# Patient Record
(demographics unavailable — no encounter records)

---

## 2024-10-15 NOTE — PHYSICAL EXAM
[Normal] : affect was normal and insight and judgment were intact [Normal Sclera/Conjunctiva] : normal sclera/conjunctiva [Normal Outer Ear/Nose] : the outer ears and nose were normal in appearance [No JVD] : no jugular venous distention [No Respiratory Distress] : no respiratory distress  [No Accessory Muscle Use] : no accessory muscle use [Clear to Auscultation] : lungs were clear to auscultation bilaterally [Normal Rate] : normal rate  [Regular Rhythm] : with a regular rhythm [No Edema] : there was no peripheral edema [Soft] : abdomen soft [Non Tender] : non-tender [Non-distended] : non-distended [Coordination Grossly Intact] : coordination grossly intact [No Focal Deficits] : no focal deficits

## 2024-10-17 NOTE — HISTORY OF PRESENT ILLNESS
[Atrial Fibrillation] : atrial fibrillation [Coronary Artery Disease] : coronary artery disease [Recent Myocardial Infarction] : recent myocardial infarction [Chronic Anticoagulation] : chronic anticoagulation [Diabetes] : diabetes [Moderate (4-6 METs)] : Moderate (4-6 METs) [Aortic Stenosis] : no aortic stenosis [Implantable Device/Pacemaker] : no implantable device/pacemaker [Asthma] : no asthma [COPD] : no COPD [Sleep Apnea] : no sleep apnea [Smoker] : not a smoker [Family Member] : no family member with adverse anesthesia reaction/sudden death [Self] : no previous adverse anesthesia reaction [Chronic Kidney Disease] : no chronic kidney disease [Anti-Platelet Agents: _____] : Anti-Platelet Agents: [unfilled] [Anticoagulants: _____] : Anticoagulants: [unfilled] [FreeTextEntry1] : carpel tunnel  [FreeTextEntry2] : 10/18/24 [FreeTextEntry3] : Dr. Gruber [FreeTextEntry4] : 58 y/o female with h/o hypothyroidism, HTN DM type 2, hemolytic anemia, CAD, cardiac stent, PAF, left carpal tunnel syndrome is coming in for [facility] for Left carpal tunnel release on 10/18/2024.  No acute complaints today.  [FreeTextEntry5] : Sep 2023 MI [FreeTextEntry6] : slight twinge in chest, but no CP, has chronic MEDINA due to hemolytic anemia. Denies orthopnea, palpitations, syncope.

## 2024-10-17 NOTE — ASSESSMENT
[As per surgery] : as per surgery [Patient Optimized for Surgery] : Patient optimized for surgery [No Further Testing Recommended] : no further testing recommended [FreeTextEntry4] : 58 y/o female with h/o hypothyroidism, HTN DM type 2, hemolytic anemia, CAD, cardiac stent, PAF, left carpal tunnel syndrome is coming in for [facility] for Left carpal tunnel release on 10/18/2024.  #Pre-op exam -Recent CBC and CMP reviewed, stable -EKG done, sinus, similar to prior EKGs  -Cleared by Cardiology today  Patient is acceptable risk for this carpel tunnel surgery, medically optimized. [FreeTextEntry5] : As per Cards [FreeTextEntry6] : As per Cards

## 2024-10-17 NOTE — ASSESSMENT
[As per surgery] : as per surgery [Patient Optimized for Surgery] : Patient optimized for surgery [No Further Testing Recommended] : no further testing recommended [FreeTextEntry4] : 58 y/o female with h/o hypothyroidism, HTN DM type 2, hemolytic anemia, CAD, cardiac stent, PAF, left carpal tunnel syndrome is coming in for [facility] for Left carpal tunnel release on 10/18/2024.  #Pre-op exam -Recent CBC and CMP reviewed, stable -EKG done, sinus, similar to prior EKGs  -Cleared by Cardiology today  Patient is acceptable risk for this carpel tunnel surgery, medically optimized. [FreeTextEntry5] : As per Cards [FreeTextEntry6] : As per Cards

## 2024-10-17 NOTE — HISTORY OF PRESENT ILLNESS
[Atrial Fibrillation] : atrial fibrillation [Coronary Artery Disease] : coronary artery disease [Recent Myocardial Infarction] : recent myocardial infarction [Chronic Anticoagulation] : chronic anticoagulation [Diabetes] : diabetes [Moderate (4-6 METs)] : Moderate (4-6 METs) [Aortic Stenosis] : no aortic stenosis [Implantable Device/Pacemaker] : no implantable device/pacemaker [Asthma] : no asthma [COPD] : no COPD [Sleep Apnea] : no sleep apnea [Smoker] : not a smoker [Family Member] : no family member with adverse anesthesia reaction/sudden death [Self] : no previous adverse anesthesia reaction [Chronic Kidney Disease] : no chronic kidney disease [Anti-Platelet Agents: _____] : Anti-Platelet Agents: [unfilled] [Anticoagulants: _____] : Anticoagulants: [unfilled] [FreeTextEntry1] : carpel tunnel  [FreeTextEntry2] : 10/18/24 [FreeTextEntry3] : Dr. Gruber [FreeTextEntry4] : 56 y/o female with h/o hypothyroidism, HTN DM type 2, hemolytic anemia, CAD, cardiac stent, PAF, left carpal tunnel syndrome is coming in for [facility] for Left carpal tunnel release on 10/18/2024.  No acute complaints today.  [FreeTextEntry5] : Sep 2023 MI [FreeTextEntry6] : slight twinge in chest, but no CP, has chronic MEDINA due to hemolytic anemia. Denies orthopnea, palpitations, syncope.

## 2024-10-17 NOTE — HISTORY OF PRESENT ILLNESS
[Atrial Fibrillation] : atrial fibrillation [Coronary Artery Disease] : coronary artery disease [Recent Myocardial Infarction] : recent myocardial infarction [Chronic Anticoagulation] : chronic anticoagulation [Diabetes] : diabetes [Moderate (4-6 METs)] : Moderate (4-6 METs) [Aortic Stenosis] : no aortic stenosis [Implantable Device/Pacemaker] : no implantable device/pacemaker [Asthma] : no asthma [COPD] : no COPD [Sleep Apnea] : no sleep apnea [Smoker] : not a smoker [Family Member] : no family member with adverse anesthesia reaction/sudden death [Self] : no previous adverse anesthesia reaction [Chronic Kidney Disease] : no chronic kidney disease [Anti-Platelet Agents: _____] : Anti-Platelet Agents: [unfilled] [Anticoagulants: _____] : Anticoagulants: [unfilled] [FreeTextEntry1] : carpel tunnel  [FreeTextEntry2] : 10/18/24 [FreeTextEntry3] : Dr. Gruber [FreeTextEntry4] : 58 y/o female with h/o hypothyroidism, HTN DM type 2, hemolytic anemia, CAD, cardiac stent, PAF, left carpal tunnel syndrome is coming in for [facility] for Left carpal tunnel release on 10/18/2024.  No acute complaints today.  [FreeTextEntry5] : Sep 2023 MI [FreeTextEntry6] : slight twinge in chest, but no CP, has chronic MEDINA due to hemolytic anemia. Denies orthopnea, palpitations, syncope.

## 2024-10-17 NOTE — ASSESSMENT
[As per surgery] : as per surgery [Patient Optimized for Surgery] : Patient optimized for surgery [No Further Testing Recommended] : no further testing recommended [FreeTextEntry4] : 56 y/o female with h/o hypothyroidism, HTN DM type 2, hemolytic anemia, CAD, cardiac stent, PAF, left carpal tunnel syndrome is coming in for [facility] for Left carpal tunnel release on 10/18/2024.  #Pre-op exam -Recent CBC and CMP reviewed, stable -EKG done, sinus, similar to prior EKGs  -Cleared by Cardiology today  Patient is acceptable risk for this carpel tunnel surgery, medically optimized. [FreeTextEntry5] : As per Cards [FreeTextEntry6] : As per Cards

## 2024-10-18 NOTE — DISCUSSION/SUMMARY
[Non-Cardiac] : non-cardiac chest pain [Coronary Artery Disease] : coronary artery disease [Hypertension] : hypertension [Stable] : stable [FreeTextEntry1] : Currently stable from a cardiovascular standpoint. Normotensive. Euvolemic. Stable CAD (s/p OM1 PCI in September 2023) with preserved LV systolic function (LVEF 55%). History of paroxysmal atrial fibrillation. Currently in sinus tachycardia. Atypical chest pain. Consider musculoskeletal etiology. Continue current medications including aspirin 81 mg daily, Eliquis, metoprolol tartrate 12.5 mg twice daily, and rosuvastatin. ECG completed today and reviewed (findings as noted above). Follow up in 6 months. At this time, patient is considered an acceptable risk from a cardiac standpoint for the anticipated carpal tunnel surgery. [EKG obtained to assist in diagnosis and management of assessed problem(s)] : EKG obtained to assist in diagnosis and management of assessed problem(s)

## 2024-10-18 NOTE — CARDIOLOGY SUMMARY
[de-identified] : 10/17/24 - sinus tachycardia, 101 bpm, LAFB, poor R-wave progression, nonspecific ST abnormality [de-identified] : 09/25/23 - normal LA, normal LV and RV size and function, mild TR, RVSP 23 mmHg, LVEF 55% [de-identified] : 09/24/23 (PCI) - SYNERGY stent to OM1 100% 09/24/23 (CATH) - OM1 100%, LVEF 45%

## 2024-10-18 NOTE — HISTORY OF PRESENT ILLNESS
[FreeTextEntry1] : Currently doing okay. Had an episode of sharp pain in her chest that was unrelated to exertion. Was told to see cardiology before her surgery. Denies shortness of breath or palpitations. Anticipating carpal tunnel surgery tomorrow.

## 2024-10-18 NOTE — REVIEW OF SYSTEMS
[SOB] : no shortness of breath [Dyspnea on exertion] : not dyspnea during exertion [Chest Discomfort] : chest discomfort [Lower Ext Edema] : no extremity edema [Leg Claudication] : no intermittent leg claudication [Palpitations] : no palpitations [Orthopnea] : no orthopnea [PND] : no PND [Syncope] : no syncope [Negative] : Musculoskeletal [FreeTextEntry5] : see HPI

## 2024-10-31 NOTE — REASON FOR VISIT
[Post Operative Visit] : a post operative visit for [FreeTextEntry2] : s/p Left carpal tunnel release and decompression, left index trigger finger.

## 2024-10-31 NOTE — PHYSICAL EXAM
[de-identified] : Patient is WDWN, alert, and in no acute distress. Breathing is unlabored. She is grossly oriented to person, place, and time.  Left Hand: Incision is healing well. There are no signs of infection. Normal amount of postoperative edema and tenderness present. Sutures were removed.

## 2024-10-31 NOTE — ADDENDUM
[FreeTextEntry1] : I, Jacob Maldonado Jr, acted solely as a scribe for Dr. Juan Alberto Gruber on this date 10/31/2024  All medical record entries made by the Scribe were at my, Dr. Juan Alberto Gruber, direction and personally dictated by me on 10/31/2024 . I have reviewed the chart and agree that the record accurately reflects my personal performance of the history, physical exam, assessment and plan. I have also personally directed, reviewed, and agreed with the chart.

## 2024-10-31 NOTE — HISTORY OF PRESENT ILLNESS
[de-identified] : The patient is a 57 year female who returns for the 1st postoperative visit after undergoing Left carpal tunnel release and decompression, left index trigger finger. at [facility]. The surgery was on 10/18/2024. The patient is recovering at home. She reports mild postoperative pain.

## 2024-10-31 NOTE — DISCUSSION/SUMMARY
[de-identified] : The underlying pathophysiology was reviewed with the patient. XR films were reviewed with the patient. Discussed at length the nature of the patients condition. The patient appears to be doing well following her left carpel tunnel surgery. Activity modifications were reviewed with the patient at length.  Patient was advised to take OTC medications and topical analgesic for pain management. Benzoin and steri strips were applied over the incision sites. Massage the scar with vitamin E oil once the strips have fallen off.   Gentle range of motion, stretching, and strengthening exercises were encouraged. Patient should actively work on gradually increasing use of the hand, as tolerated. All questions answered, understanding verbalized. Patient in agreement with plan of care.  The patient should follow-up with me in 6 weeks for further assessment of her symptoms.

## 2024-11-20 NOTE — PHYSICAL EXAM
[Normal] : normal appearance, no rash, nodules, vesicles, ulcers, erythema [de-identified] : Trace pitting edema of bilateral ankles

## 2024-11-20 NOTE — HISTORY OF PRESENT ILLNESS
[de-identified] : \par  55 year old female \par  \par  Recurrent WAIHA, unable to taper off steroids. \par  First presented march 2017 with Hb 4.8, FRANC + IgG and C3 s/p 3 units prbc; rigors with 1 unit, prbc stopped, also found to be b12 def. Pred taper ended 5/12/17 \par  Relapse 1 off steroids 5/30/17. Prolonged Taper, relapsed again \par  Relapse 2 11/2017 on very low dose prednisone (2.5mg)\par  Relapse 3 3/2018 on steroids, presnidone dose < 10mg, agreed to rituximab. Given 3/23/18-4/13/18\par  Relapse 4 6/2018 on steroids prednisone dose <10mg-very slow taper given\par  Relapse 5 2/2019 when went from prednisone 10/7.5 \par  Relapse 6 5/27/19 on prednisone 10 after moving to this dose x 2-3 days\par  Splenectomy, relapsed shortly after 6/2019\par  Many of these relapsed are not correlated with a visit but a cbc check-\par  Post splenecomty with decrease of steroids after 3 months of csa- started to relapse\par  Also found to have an abscess in her old spleen site \par  CSA switched to Cyclophosphamide 10/1/19,\par  COVID 19 late march 2020- relapse of AIHA RCD given: 4/8/20, 4/15/20, 4/22/20, 4/29/20 in the hospital\par  \par  Bone marrow biopsy performed: 3/2018 before rituximab\par  CT CAP 8/2018 negative\par  Rheum work up without other etiology\par  She removed IUD, stopped her herbals all with the possibility that this was fueling the process.\par  She has hypothyroidism and has had ASIA requiring PRBC transfusion in the past \par   [de-identified] : Prednisone 5mg and azathioprine 50mg once a day. S/p surgery for carpal tunnel and lysis of scar tissue 3/29/24. Reports back pain. No lightheadedness, dyspnea, chest pain. Planned for back surgery 11/26/24.

## 2024-11-20 NOTE — PHYSICAL EXAM
[Normal] : normal appearance, no rash, nodules, vesicles, ulcers, erythema [de-identified] : Trace pitting edema of bilateral ankles

## 2024-11-20 NOTE — ASSESSMENT
[FreeTextEntry1] : This is a 57 year old woman with a longstanding history of anemia. More recently this was a refractory WAIHA. Patient has been treated with multiple lines of therapy including but not limited to Prednisone, rituximab, splenectomy, Cyclosporin, and more recently mycophenolate mofetil with prednisone. Patient Hg had bee up an down as she was ill from various illnesses, including an MI. Hg went down in the 9's prednisone was increased from the low taper of 4mg to 40mg again, now tapered down to 5 mg. Hg 9.3 on 11/12/24 and she has an upcoming back surgery on 11/26/24, so will increase prednisone to 10 mg qd. Continue Imuran 50mg daily.    RTC 3 months. Continue monitoring monthly CBCs.  Patient seen and examined with Dr. Gillespie.  Fausto Vergara M.D. Hematology/Oncology Fellow PGY-6 [facility]   I discussed this patient in a pre-clinic session with the fellow including review of clinical status and last labs.  I also saw the patient and discussed history, completed an exam and discussed the plan together with the fellow.  Total time spent today on this patient    33  minutes. This is a 57 year old woman with longstanding history of antibody mediated autoimmune hemolytic anemia.  Mild setback this pats week,  Had stabilized but is not increasing lately. Hg 9.3 consistent with last week. Recommend continuing current dose of azathioprine along with prednisone.  Would begin tapering as soon as Hg starts to rise again. Patient with history of multiple relapses.    Patient is scheduled for lumbar laminectomy and fusion. Most recent HGB 9.8 g/dL. Patient is hematologically cleared for surgery as planned. Patient should have type and cross drawn and transfused for hemoglobin HGB < 8.0 g/dL.

## 2024-11-20 NOTE — HISTORY OF PRESENT ILLNESS
[de-identified] : \par  55 year old female \par  \par  Recurrent WAIHA, unable to taper off steroids. \par  First presented march 2017 with Hb 4.8, FRANC + IgG and C3 s/p 3 units prbc; rigors with 1 unit, prbc stopped, also found to be b12 def. Pred taper ended 5/12/17 \par  Relapse 1 off steroids 5/30/17. Prolonged Taper, relapsed again \par  Relapse 2 11/2017 on very low dose prednisone (2.5mg)\par  Relapse 3 3/2018 on steroids, presnidone dose < 10mg, agreed to rituximab. Given 3/23/18-4/13/18\par  Relapse 4 6/2018 on steroids prednisone dose <10mg-very slow taper given\par  Relapse 5 2/2019 when went from prednisone 10/7.5 \par  Relapse 6 5/27/19 on prednisone 10 after moving to this dose x 2-3 days\par  Splenectomy, relapsed shortly after 6/2019\par  Many of these relapsed are not correlated with a visit but a cbc check-\par  Post splenecomty with decrease of steroids after 3 months of csa- started to relapse\par  Also found to have an abscess in her old spleen site \par  CSA switched to Cyclophosphamide 10/1/19,\par  COVID 19 late march 2020- relapse of AIHA RCD given: 4/8/20, 4/15/20, 4/22/20, 4/29/20 in the hospital\par  \par  Bone marrow biopsy performed: 3/2018 before rituximab\par  CT CAP 8/2018 negative\par  Rheum work up without other etiology\par  She removed IUD, stopped her herbals all with the possibility that this was fueling the process.\par  She has hypothyroidism and has had ASIA requiring PRBC transfusion in the past \par   [de-identified] : Prednisone 5mg and azathioprine 50mg once a day. S/p surgery for carpal tunnel and lysis of scar tissue 3/29/24. Reports back pain. No lightheadedness, dyspnea, chest pain. Planned for back surgery 11/26/24.

## 2024-11-20 NOTE — HISTORY OF PRESENT ILLNESS
[de-identified] : \par  55 year old female \par  \par  Recurrent WAIHA, unable to taper off steroids. \par  First presented march 2017 with Hb 4.8, FRANC + IgG and C3 s/p 3 units prbc; rigors with 1 unit, prbc stopped, also found to be b12 def. Pred taper ended 5/12/17 \par  Relapse 1 off steroids 5/30/17. Prolonged Taper, relapsed again \par  Relapse 2 11/2017 on very low dose prednisone (2.5mg)\par  Relapse 3 3/2018 on steroids, presnidone dose < 10mg, agreed to rituximab. Given 3/23/18-4/13/18\par  Relapse 4 6/2018 on steroids prednisone dose <10mg-very slow taper given\par  Relapse 5 2/2019 when went from prednisone 10/7.5 \par  Relapse 6 5/27/19 on prednisone 10 after moving to this dose x 2-3 days\par  Splenectomy, relapsed shortly after 6/2019\par  Many of these relapsed are not correlated with a visit but a cbc check-\par  Post splenecomty with decrease of steroids after 3 months of csa- started to relapse\par  Also found to have an abscess in her old spleen site \par  CSA switched to Cyclophosphamide 10/1/19,\par  COVID 19 late march 2020- relapse of AIHA RCD given: 4/8/20, 4/15/20, 4/22/20, 4/29/20 in the hospital\par  \par  Bone marrow biopsy performed: 3/2018 before rituximab\par  CT CAP 8/2018 negative\par  Rheum work up without other etiology\par  She removed IUD, stopped her herbals all with the possibility that this was fueling the process.\par  She has hypothyroidism and has had ASIA requiring PRBC transfusion in the past \par   [de-identified] : Prednisone 5mg and azathioprine 50mg once a day. S/p surgery for carpal tunnel and lysis of scar tissue 3/29/24. Reports back pain. No lightheadedness, dyspnea, chest pain. Planned for back surgery 11/26/24.

## 2024-11-20 NOTE — ASSESSMENT
[FreeTextEntry1] : This is a 57 year old woman with a longstanding history of anemia. More recently this was a refractory WAIHA. Patient has been treated with multiple lines of therapy including but not limited to Prednisone, rituximab, splenectomy, Cyclosporin, and more recently mycophenolate mofetil with prednisone. Patient Hg had bee up an down as she was ill from various illnesses, including an MI. Hg went down in the 9's prednisone was increased from the low taper of 4mg to 40mg again, now tapered down to 5 mg. Hg 9.3 on 11/12/24 and she has an upcoming back surgery on 11/26/24, so will increase prednisone to 10 mg qd. Continue Imuran 50mg daily.    RTC 3 months. Continue monitoring monthly CBCs.  Patient seen and examined with Dr. Gillespie.  Fausto Vergara M.D. Hematology/Oncology Fellow PGY-6 [facility] [address]   I discussed this patient in a pre-clinic session with the fellow including review of clinical status and last labs.  I also saw the patient and discussed history, completed an exam and discussed the plan together with the fellow.  Total time spent today on this patient    33  minutes. This is a 57 year old woman with longstanding history of antibody mediated autoimmune hemolytic anemia.  Mild setback this pats week,  Had stabilized but is not increasing lately. Hg 9.3 consistent with last week. Recommend continuing current dose of azathioprine along with prednisone.  Would begin tapering as soon as Hg starts to rise again. Patient with history of multiple relapses.    Patient is scheduled for lumbar laminectomy and fusion. Most recent HGB 9.8 g/dL. Patient is hematologically cleared for surgery as planned. Patient should have type and cross drawn and transfused for hemoglobin HGB < 8.0 g/dL.

## 2024-11-20 NOTE — ASSESSMENT
[FreeTextEntry1] : This is a 57 year old woman with a longstanding history of anemia. More recently this was a refractory WAIHA. Patient has been treated with multiple lines of therapy including but not limited to Prednisone, rituximab, splenectomy, Cyclosporin, and more recently mycophenolate mofetil with prednisone. Patient Hg had bee up an down as she was ill from various illnesses, including an MI. Hg went down in the 9's prednisone was increased from the low taper of 4mg to 40mg again, now tapered down to 5 mg. Hg 9.3 on 11/12/24 and she has an upcoming back surgery on 11/26/24, so will increase prednisone to 10 mg qd. Continue Imuran 50mg daily.    RTC 3 months. Continue monitoring monthly CBCs.  Patient seen and examined with Dr. Gillespie.  Fausto Vergara M.D. Hematology/Oncology Fellow PGY-6 [facility]   I discussed this patient in a pre-clinic session with the fellow including review of clinical status and last labs.  I also saw the patient and discussed history, completed an exam and discussed the plan together with the fellow.  Total time spent today on this patient    33  minutes. This is a 57 year old woman with longstanding history of antibody mediated autoimmune hemolytic anemia.  Mild setback this pats week,  Had stabilized but is not increasing lately. Hg 9.3 consistent with last week. Recommend continuing current dose of azathioprine along with prednisone.  Would begin tapering as soon as Hg starts to rise again. Patient with history of multiple relapses.    Patient is scheduled for lumbar laminectomy and fusion. Most recent HGB 9.8 g/dL. Patient is hematologically cleared for surgery as planned. Patient should have type and cross drawn and transfused for hemoglobin HGB < 8.0 g/dL.

## 2024-11-20 NOTE — PHYSICAL EXAM
[Normal] : normal appearance, no rash, nodules, vesicles, ulcers, erythema [de-identified] : Trace pitting edema of bilateral ankles

## 2024-11-20 NOTE — HISTORY OF PRESENT ILLNESS
[de-identified] : \par  55 year old female \par  \par  Recurrent WAIHA, unable to taper off steroids. \par  First presented march 2017 with Hb 4.8, FRANC + IgG and C3 s/p 3 units prbc; rigors with 1 unit, prbc stopped, also found to be b12 def. Pred taper ended 5/12/17 \par  Relapse 1 off steroids 5/30/17. Prolonged Taper, relapsed again \par  Relapse 2 11/2017 on very low dose prednisone (2.5mg)\par  Relapse 3 3/2018 on steroids, presnidone dose < 10mg, agreed to rituximab. Given 3/23/18-4/13/18\par  Relapse 4 6/2018 on steroids prednisone dose <10mg-very slow taper given\par  Relapse 5 2/2019 when went from prednisone 10/7.5 \par  Relapse 6 5/27/19 on prednisone 10 after moving to this dose x 2-3 days\par  Splenectomy, relapsed shortly after 6/2019\par  Many of these relapsed are not correlated with a visit but a cbc check-\par  Post splenecomty with decrease of steroids after 3 months of csa- started to relapse\par  Also found to have an abscess in her old spleen site \par  CSA switched to Cyclophosphamide 10/1/19,\par  COVID 19 late march 2020- relapse of AIHA RCD given: 4/8/20, 4/15/20, 4/22/20, 4/29/20 in the hospital\par  \par  Bone marrow biopsy performed: 3/2018 before rituximab\par  CT CAP 8/2018 negative\par  Rheum work up without other etiology\par  She removed IUD, stopped her herbals all with the possibility that this was fueling the process.\par  She has hypothyroidism and has had ASIA requiring PRBC transfusion in the past \par   [de-identified] : Prednisone 5mg and azathioprine 50mg once a day. S/p surgery for carpal tunnel and lysis of scar tissue 3/29/24. Reports back pain. No lightheadedness, dyspnea, chest pain. Planned for back surgery 11/26/24.

## 2024-11-20 NOTE — ASSESSMENT
[FreeTextEntry1] : This is a 57 year old woman with a longstanding history of anemia. More recently this was a refractory WAIHA. Patient has been treated with multiple lines of therapy including but not limited to Prednisone, rituximab, splenectomy, Cyclosporin, and more recently mycophenolate mofetil with prednisone. Patient Hg had bee up an down as she was ill from various illnesses, including an MI. Hg went down in the 9's prednisone was increased from the low taper of 4mg to 40mg again, now tapered down to 5 mg. Hg 9.3 on 11/12/24 and she has an upcoming back surgery on 11/26/24, so will increase prednisone to 10 mg qd. Continue Imuran 50mg daily.    RTC 3 months. Continue monitoring monthly CBCs.  Patient seen and examined with Dr. Gillespie.  Fausto Vergara M.D. Hematology/Oncology Fellow PGY-6 [facility]   I discussed this patient in a pre-clinic session with the fellow including review of clinical status and last labs.  I also saw the patient and discussed history, completed an exam and discussed the plan together with the fellow.  Total time spent today on this patient    33  minutes. This is a 57 year old woman with longstanding history of antibody mediated autoimmune hemolytic anemia.  Mild setback this pats week,  Had stabilized but is not increasing lately. Hg 9.3 consistent with last week. Recommend continuing current dose of azathioprine along with prednisone.  Would begin tapering as soon as Hg starts to rise again. Patient with history of multiple relapses.    Patient is scheduled for lumbar laminectomy and fusion. Most recent HGB 9.8 g/dL. Patient is hematologically cleared for surgery as planned. Patient should have type and cross drawn and transfused for hemoglobin HGB < 8.0 g/dL.

## 2024-11-20 NOTE — PHYSICAL EXAM
[Normal] : normal appearance, no rash, nodules, vesicles, ulcers, erythema [de-identified] : Trace pitting edema of bilateral ankles